# Patient Record
Sex: MALE
[De-identification: names, ages, dates, MRNs, and addresses within clinical notes are randomized per-mention and may not be internally consistent; named-entity substitution may affect disease eponyms.]

---

## 2017-07-10 NOTE — RAD
PROCEDURE:  Right Foot Radiographs.



HISTORY:

Lateral pain  



COMPARISON:

None.



FINDINGS:



BONES:

No evidence of acute displaced fracture nor dislocation. 



Probable old healed fracture deformity proximal aspect 3rd metatarsal 



JOINTS:

Minor degenerative changes with small osteophytes seen arising from 

the anterior and inferior margins of the distal tibia. .



SOFT TISSUES:

Minor soft tissue swelling lateral greater than medial 



OTHER FINDINGS:

None.



IMPRESSION:

No evidence of acute displaced fracture nor dislocation. .  Probable 

old healed fracture deformity proximal 3rd metatarsal 



Minor degenerative changes medial aspect right tibiotalar 

articulation. Minor soft tissue swelling lateral greater than medial

## 2017-07-10 NOTE — CP.PCM.CON
History of Present Illness





- History of Present Illness


History of Present Illness: 





56 y/o male with no significant PMHx seen at bedside in ED complaining of pain 

and swelling in his right ankle. Pt states that the pain started yesterday and 

it ha progressively gotten worst from then on. Pt states that this morning his 

pain was 10/10 on VAS which prompted him to come to the ED. Pt states that he 

did not take any pain medications prior to his arrival to the ED. Pt denies of 

any trauma or injury to the foot. 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 

worker on the roofs but denies of any ankle twisting. Pt states that this 

happens in his knee joint as well as shoulder joint at time to time. Pt 

describes his diet as very red meat heavy. Pt denies of trying any new activity 

or new shoe gear. Pt denies of any recent F/V/C/SOB today. No other pedal 

complains are noted at this time. 





PMHx: denies


PSHx: denies


SHx: denies


Allergies: Penicillins





Review of Systems





- Constitutional


Constitutional: As Per HPI





Past Patient History





- Infectious Disease


Hx of Infectious Diseases: None





- Past Social History


Alcohol: None


Drugs: Denies





- PSYCHIATRIC


Hx Substance Use: No





- SURGICAL HISTORY


Hx Cholecystectomy: Yes





- ANESTHESIA


Hx Anesthesia: Yes





Meds


Home Medications: 


 Home Medication List











 Medication  Instructions  Recorded  Confirmed  Type


 


Naproxen [Naprosyn] 500 mg PO BID PRN #20 tablet 07/10/17  Rx











Allergies/Adverse Reactions: 


 Allergies











Allergy/AdvReac Type Severity Reaction Status Date / Time


 


Penicillins Allergy  RASH Verified 07/01/16 20:50














Physical Exam





- Constitutional


Appears: Well, Non-toxic, No Acute Distress





- Extremities Exam


Additional comments: 





VASC: DP/PT pulses are palpable 2/4 b/l, CRT: < 3 sec to all digits, 

temperature gradient is warm to cool on the left and warm to warm on the right, 

non-pitting edema with erythema noted on the lateral aspect of the right ankle 

as well as medial aspect





DERM: no open lesions, no interdigital maceration, skin is intact with no 

puncturing of the skin surrounding the right ankle, no clinical suspicion of 

infection





NEURO: protective sensation grossly intact





ORTHO: MMT: 4/5 on the right and 5/5 on left during dorsiflexion, plantarflexion

, inversion and eversion. slightly diminished ROM at the ankle joint on the 

right, pain upon palpation on the posterior lateral aspect of the lateral 

malleolus, diffuse pain surrounding the anterior ankle as well as medial ankle, 

mild pain on plantarflexion and eversion at the ankle joint, Campos test is 

negative, no pain on palpation of the calf b/l





- Neurological Exam


Neurological exam: Alert, Oriented x3





- Psychiatric Exam


Psychiatric exam: Normal Affect, Normal Mood





Results





- Vital Signs


Recent Vital Signs: 


 Last Vital Signs











Temp  97.6 F   07/10/17 14:04


 


Pulse  78   07/10/17 14:04


 


Resp  19   07/10/17 14:04


 


BP  126/78   07/10/17 14:04


 


Pulse Ox  98   07/10/17 14:04














- Labs


Result Diagrams: 


 07/10/17 10:13





 07/10/17 10:13


Labs: 


 Laboratory Results - last 24 hr











  07/10/17 07/10/17 07/10/17





  10:13 10:13 10:13


 


WBC  8.0  


 


RBC  5.23  


 


Hgb  15.6  


 


Hct  45.5  


 


MCV  87.0  


 


MCH  29.8  


 


MCHC  34.3  


 


RDW  13.6  


 


Plt Count  148  


 


MPV  10.0  


 


Neut % (Auto)  69.0  


 


Lymph % (Auto)  18.7 L  


 


Mono % (Auto)  10.9 H  


 


Eos % (Auto)  1.0  


 


Baso % (Auto)  0.4  


 


Neut #  5.5  


 


Lymph #  1.5  


 


Mono #  0.9 H  


 


Eos #  0.1  


 


Baso #  0.0  


 


PT    11.7


 


INR    1.0


 


APTT    31.8


 


Sodium   138 


 


Potassium   3.8 


 


Chloride   101 


 


Carbon Dioxide   27 


 


Anion Gap   13 


 


BUN   24 H 


 


Creatinine   0.7 L 


 


Est GFR ( Amer)   > 60 


 


Est GFR (Non-Af Amer)   > 60 


 


Random Glucose   97 


 


Uric Acid   4.1 


 


Calcium   9.4 


 


Total Bilirubin   0.7 


 


AST   36 


 


ALT   60 


 


Alkaline Phosphatase   88 


 


Total Protein   7.6 


 


Albumin   4.4 


 


Globulin   3.2 


 


Albumin/Globulin Ratio   1.4 














Assessment & Plan





- Assessment and Plan (Free Text)


Assessment: 





56 y/o male seen at bedside in ED for pain in the right ankle secondary to 

gouty attack


Plan: 





Pt evaluated and chart reviewed


Pt discussed in details with attending Dr. Hernandez


Vitals and labs reviewed (pt is afebrile, WBC @ 8.0 and uric acid levels are 4.1

)


x-rays taken and reviewed: no gross danielle abnormalities noted, no fractures, no 

malalignment of the ankle joint noted


Pt educated the possible etiology of the ankle pain and educated to change his 

diet


Pt educated to elevate the leg and provide compression using an ACE bandage


Pt given an anti-inflammatory to manage his pain


Pt educated to follow up in podiatry clinic


Pt educated if the symptoms get worst or if any systemic symptomes are felt 

then return to ED sooner


Pt demonstrated verbal understanding


Pt stable from podiatry standpoint


Thank you for podiatry consult





- Date & Time


Date: 07/10/17


Time: 14:37

## 2017-07-10 NOTE — ED PDOC
Lower Extremity Pain/Injury


Time Seen by Provider: 07/10/17 09:42


Chief Complaint (Nursing): Lower Extremity Problem/Injury


Chief Complaint (Provider): Right foot swelling and pain


History Per: Patient


History/Exam Limitations: no limitations


Onset/Duration Of Symptoms: Days (x2 days)


Current Symptoms Are (Timing): Still Present


Pain Scale Rating Of: 10


Additional History Per: Family


Additional Complaint(s): 








Cal Humphries is a 57-year-old male who was brought by family to the emergency 

department for an evaluation of the right foot for swelling/pain and nausea, 

ongoing x2 days. Patient first noticed the swelling yesterday morning when he 

woke up and iced the area without relief. The swelling has increased over the 

past 24 hours. Patient reports being unable to sleep last night due to pain. 

Denies taking medications for the relief of symptoms, fever, fall and injury. 





PMD: None reported 





Past Medical History


Reviewed: Historical Data, Nursing Documentation, Vital Signs


Vital Signs: 





 Last Vital Signs











Temp  97 F L  07/10/17 09:13


 


Pulse  66   07/10/17 09:13


 


Resp      


 


BP  112/57 L  07/10/17 09:13


 


Pulse Ox  98   07/10/17 09:13














- Medical History


PMH: No Chronic Diseases





- Surgical History


Surgical History: Cholecystectomy





- Family History


Family History: States: Unknown Family Hx





- Social History


Current smoker - smoking cessation education provided: No


Alcohol: None


Drugs: Denies





- Home Medications


Home Medications: 


 Ambulatory Orders











 Medication  Instructions  Recorded


 


Erythromycin 0.5% [Erythromycin] 1 applic RIGHTEYE Q6 #1 tube 07/01/16


 


Tramadol HCl [Ultram] 50 mg PO BID PRN #30 tablet 07/01/16


 


Naproxen [Naprosyn] 500 mg PO BID PRN #20 tablet 07/10/17














- Allergies


Allergies/Adverse Reactions: 


 Allergies











Allergy/AdvReac Type Severity Reaction Status Date / Time


 


Penicillins Allergy  RASH Verified 07/01/16 20:50














Review of Systems


ROS Statement: Except As Marked, All Systems Reviewed And Found Negative


Constitutional: Negative for: Fever, Other (Fall or injury)


Gastrointestinal: Positive for: Nausea


Musculoskeletal: Positive for: Foot Pain (Right foot swelling and pain)





Physical Exam





- Reviewed


Nursing Documentation Reviewed: Yes


Vital Signs Reviewed: Yes





- Physical Exam


Appears: Positive for: Non-toxic, No Acute Distress


Head Exam: Positive for: ATRAUMATIC, NORMAL INSPECTION, NORMOCEPHALIC


Skin: Positive for: Normal Color, Warm, Dry


Eye Exam: Positive for: EOMI, Normal appearance, PERRL


Neck: Positive for: Normal, Painless ROM, Supple


Pulses-Dorsalis Pedis (L): 2+


Pulses-Dorsalis Pedis (R): 2+


Extremity: Positive for: Tenderness (Right foot), Swelling (Erythema to the 

lateral malleolar area with edema. Sensation intact).  Negative for: Normal ROM


Neurologic/Psych: Positive for: Alert, Oriented.  Negative for: Motor/Sensory 

Deficits





- Laboratory Results


Result Diagrams: 


 07/10/17 10:13





 07/10/17 10:13





- ECG


O2 Sat by Pulse Oximetry: 98 (RA)


Pulse Ox Interpretation: Normal





Medical Decision Making


Medical Decision Making: 








Time: 10:00


Initial impression: Right foot swelling


Initial plan:


---CMP 


---CBC


---Uric acid


---PTT 


---Prothrombin time


---X-Ray right ankle


---X-Ray right foot


---Toradol 15 mg IM


---Reassessment





Time: 12: 17


--X-ray of the ankle and foot show no significant abnormalities (read by me). 


--Pending podiatry on call to come and evaluate patient. 





Pt evaluated by Podiatry, findings c/w gout.  Discharge home with NSAIDs to 

follow-up with Podiatry Clinic. 





Scribe Attestation:


Documented by Kasia Tran, acting as a scribe for Evelia Shetty MD.





Provider Scribe Attestation:


All medical record entries made by the Scribe were at my direction and 

personally dictated by me. I have reviewed the chart and agree that the record 

accurately reflects my personal performance of the history, physical exam, 

medical decision making, and the department course for this patient. I have 

also personally directed, reviewed, and agree with the discharge instructions 

and disposition.








Disposition





- Clinical Impression


Clinical Impression: 


 Gout of ankle








- Disposition


Referrals: 


Podiatry Clinic [Outside]


Disposition: Routine/Home


Disposition Time: 13:36


Condition: STABLE


Prescriptions: 


Naproxen [Naprosyn] 500 mg PO BID PRN #20 tablet


 PRN Reason: Pain, Moderate (4-7)


Instructions:  Gout (ED)


Print Language: Bulgarian

## 2017-07-10 NOTE — RAD
PROCEDURE:  Right Ankle Radiographs.



HISTORY:

Lateral pain, redness, swelling  



COMPARISON:

None



FINDINGS:



BONES:

No evidence of acute displaced fracture nor dislocation.  Osseous 

structures appear intact.  Talar dome intact. . 



JOINTS:

Ankle mortise maintained. .  There is a tiny osteophyte seen arising 

from the inferior tip of the medial malleolus with minor degenerative 

changes along the medial aspect of the tibiotalar articulation. Mild 

soft tissue swelling overlying the lateral and to a lesser degree 

medial malleoli. 



SOFT TISSUES:

Normal. 



OTHER FINDINGS:

None.



IMPRESSION:

No evidence of acute displaced fracture nor dislocation. Minor 

degenerative changes tibiotalar articulation changes. Mild soft 

tissue swelling lateral greater than medial.

## 2018-02-01 ENCOUNTER — HOSPITAL ENCOUNTER (OUTPATIENT)
Dept: HOSPITAL 14 - H.ER | Age: 58
Setting detail: OBSERVATION
LOS: 2 days | Discharge: HOME | End: 2018-02-03
Attending: FAMILY MEDICINE | Admitting: FAMILY MEDICINE
Payer: SELF-PAY

## 2018-02-01 VITALS — BODY MASS INDEX: 28.4 KG/M2

## 2018-02-01 DIAGNOSIS — K57.32: Primary | ICD-10-CM

## 2018-02-01 DIAGNOSIS — M10.9: ICD-10-CM

## 2018-02-01 DIAGNOSIS — R07.9: ICD-10-CM

## 2018-02-01 DIAGNOSIS — I10: ICD-10-CM

## 2018-02-01 DIAGNOSIS — K59.00: ICD-10-CM

## 2018-02-01 DIAGNOSIS — I25.2: ICD-10-CM

## 2018-02-01 DIAGNOSIS — Z98.61: ICD-10-CM

## 2018-02-01 LAB
ALBUMIN SERPL-MCNC: 4.4 G/DL (ref 3.5–5)
ALBUMIN/GLOB SERPL: 1.4 {RATIO} (ref 1–2.1)
ALT SERPL-CCNC: 49 U/L (ref 21–72)
AST SERPL-CCNC: 36 U/L (ref 17–59)
BACTERIA #/AREA URNS HPF: (no result) /[HPF]
BASOPHILS # BLD AUTO: 0 K/UL (ref 0–0.2)
BASOPHILS NFR BLD: 0.3 % (ref 0–2)
BILIRUB UR-MCNC: NEGATIVE MG/DL
BUN SERPL-MCNC: 25 MG/DL (ref 9–20)
CALCIUM SERPL-MCNC: 9.5 MG/DL (ref 8.4–10.2)
COLOR UR: YELLOW
EOSINOPHIL # BLD AUTO: 0.1 K/UL (ref 0–0.7)
EOSINOPHIL NFR BLD: 1.7 % (ref 0–4)
ERYTHROCYTE [DISTWIDTH] IN BLOOD BY AUTOMATED COUNT: 13.9 % (ref 11.5–14.5)
GFR NON-AFRICAN AMERICAN: > 60
GLUCOSE UR STRIP-MCNC: (no result) MG/DL
HGB BLD-MCNC: 15 G/DL (ref 12–18)
LEUKOCYTE ESTERASE UR-ACNC: (no result) LEU/UL
LIPASE SERPL-CCNC: 81 U/L (ref 23–300)
LYMPHOCYTES # BLD AUTO: 2.2 K/UL (ref 1–4.3)
LYMPHOCYTES NFR BLD AUTO: 32 % (ref 20–40)
MCH RBC QN AUTO: 28.4 PG (ref 27–31)
MCHC RBC AUTO-ENTMCNC: 32.9 G/DL (ref 33–37)
MCV RBC AUTO: 86.2 FL (ref 80–94)
MONOCYTES # BLD: 0.6 K/UL (ref 0–0.8)
MONOCYTES NFR BLD: 8.5 % (ref 0–10)
NEUTROPHILS # BLD: 3.9 K/UL (ref 1.8–7)
NEUTROPHILS NFR BLD AUTO: 57.5 % (ref 50–75)
NRBC BLD AUTO-RTO: 0.9 % (ref 0–0)
PH UR STRIP: 5 [PH] (ref 5–8)
PLATELET # BLD: 164 K/UL (ref 130–400)
PMV BLD AUTO: 9.8 FL (ref 7.2–11.7)
PROT UR STRIP-MCNC: 30 MG/DL
RBC # BLD AUTO: 5.27 MIL/UL (ref 4.4–5.9)
RBC # UR STRIP: NEGATIVE /UL
SP GR UR STRIP: 1.03 (ref 1–1.03)
SQUAMOUS EPITHIAL: < 1 /HPF (ref 0–5)
URINE CLARITY: (no result)
URINE NITRATE: NEGATIVE
UROBILINOGEN UR-MCNC: 2 MG/DL (ref 0.2–1)
WBC # BLD AUTO: 6.7 K/UL (ref 4.8–10.8)

## 2018-02-01 PROCEDURE — 96361 HYDRATE IV INFUSION ADD-ON: CPT

## 2018-02-01 PROCEDURE — 71046 X-RAY EXAM CHEST 2 VIEWS: CPT

## 2018-02-01 PROCEDURE — 84484 ASSAY OF TROPONIN QUANT: CPT

## 2018-02-01 PROCEDURE — 99285 EMERGENCY DEPT VISIT HI MDM: CPT

## 2018-02-01 PROCEDURE — 96365 THER/PROPH/DIAG IV INF INIT: CPT

## 2018-02-01 PROCEDURE — 80053 COMPREHEN METABOLIC PANEL: CPT

## 2018-02-01 PROCEDURE — 74176 CT ABD & PELVIS W/O CONTRAST: CPT

## 2018-02-01 PROCEDURE — 93005 ELECTROCARDIOGRAM TRACING: CPT

## 2018-02-01 PROCEDURE — 96376 TX/PRO/DX INJ SAME DRUG ADON: CPT

## 2018-02-01 PROCEDURE — 96372 THER/PROPH/DIAG INJ SC/IM: CPT

## 2018-02-01 PROCEDURE — 96366 THER/PROPH/DIAG IV INF ADDON: CPT

## 2018-02-01 PROCEDURE — 96375 TX/PRO/DX INJ NEW DRUG ADDON: CPT

## 2018-02-01 PROCEDURE — 87086 URINE CULTURE/COLONY COUNT: CPT

## 2018-02-01 PROCEDURE — 81003 URINALYSIS AUTO W/O SCOPE: CPT

## 2018-02-01 PROCEDURE — 85025 COMPLETE CBC W/AUTO DIFF WBC: CPT

## 2018-02-01 PROCEDURE — 83690 ASSAY OF LIPASE: CPT

## 2018-02-01 NOTE — CT
EXAM:

  CT Abdomen and Pelvis Without Intravenous Contrast



CLINICAL HISTORY:

  57 years old, male; Pain; Abdominal pain; Flank; Right; Prior surgery; 

Surgery date: 6+ months; Surgery type: Gb removed; Additional info: Evaluate 

for renal colic



TECHNIQUE:

  Axial computed tomography images of the abdomen and pelvis without 

intravenous contrast.  All CT scans at this facility use one or more dose 

reduction techniques, viz.: automated exposure control; ma/kV adjustment per 

patient size (including targeted exams where dose is matched to indication; 

i.e. head); or iterative reconstruction technique.

  Coronal and sagittal reformatted images were created and reviewed.



COMPARISON:

  No relevant prior studies available.



FINDINGS:

  Lower thorax:  Minimal atelectasis.  RIGHT lower lobe calcified granuloma.



 ABDOMEN:

  Liver:  Few calcifications.

  Gallbladder and bile ducts:  Cholecystectomy.  No significant ductal dilation.

  Pancreas:  Unremarkable.  No ductal dilation.

  Spleen:  No splenomegaly.

  Adrenals:  No mass.

  Kidneys and ureters:  No renal calculi.  No hydronephrosis.

  Stomach and bowel:  Few scattered diverticula within colon.  Apparent minimal 

stranding about diverticulum along ascending colon.  No definite mural 

thickening.  No obstruction.

  Appendix:  Normal caliber.  No inflammation.



 PELVIS:

  Bladder:  Unremarkable.  No stones.

  Reproductive:  Unremarkable as visualized.



 ABDOMEN and PELVIS:

  Intraperitoneal space:  No significant fluid collection.  No free air.

  Bones/joints:  Degenerative changes of spine.  No acute fracture.  Probable 

bone islands.

  Soft tissues:  Small calcification or clip along anterior abdominal wall.

  Vasculature:  Mild atherosclerotic disease.  No aneurysm.

  Lymph nodes:  No pathologically enlarged lymph nodes.



IMPRESSION:     

1.  No definite CT evidence of urolithiasis.

2.  Possible early diverticulitis of right colon.

3.  Incidental/non-acute findings are described above.

## 2018-02-01 NOTE — ED PDOC
HPI: Abdomen


Time Seen by Provider: 02/01/18 20:43


Chief Complaint (Nursing): Abdominal Pain


Chief Complaint (Provider): abdominal pain


History Per: Patient (56 y/o male here with complaint of generalized abdominal 

pain located on right side x 4 days.   States pain radiating to his back waxing 

and waning and feels like a cramp.  States symptoms began with left sided chest 

pain intermittent pulsating.  Denies any vomiting but states he attempted to 

make himself vomit.  denies any fevers or chills.  States he has had cardiac 

catherization in past wnl.  Has had gallbladder removed in past.)





Past Medical History


Reviewed: Historical Data, Nursing Documentation, Vital Signs


Vital Signs: 


 Last Vital Signs











Temp  96.0 F L  02/01/18 20:05


 


Pulse  60   02/01/18 20:05


 


Resp  16   02/01/18 20:05


 


BP  126/78   02/01/18 20:05


 


Pulse Ox  100   02/01/18 22:33














- Medical History


PMH: HTN


   Denies: Chronic Kidney Disease





- Surgical History


Surgical History: Cholecystectomy





- Family History


Family History: States: Unknown Family Hx





- Home Medications


Home Medications: 


 Ambulatory Orders











 Medication  Instructions  Recorded


 


Aspirin [Ecotrin] 81 mg PO DAILY 09/13/17


 


Atorvastatin [Lipitor] 20 mg PO HS #30 tab 09/14/17


 


Famotidine [Heartburn Prevention] 20 mg PO BID #60 tablet 09/14/17


 


Lidocaine 5% [Lidoderm] 1 ea TD DAILY #30 patch 09/14/17














- Allergies


Allergies/Adverse Reactions: 


 Allergies











Allergy/AdvReac Type Severity Reaction Status Date / Time


 


Penicillins Allergy  RASH Verified 07/01/16 20:50














Review of Systems


ROS Statement: Except As Marked, All Systems Reviewed And Found Negative


Cardiovascular: Positive for: Chest Pain


Gastrointestinal: Positive for: Abdominal Pain





Physical Exam





- Reviewed


Nursing Documentation Reviewed: Yes


Vital Signs Reviewed: Yes





- Physical Exam


Appears: Positive for: Well, Non-toxic, No Acute Distress


Head Exam: Positive for: ATRAUMATIC, NORMAL INSPECTION, NORMOCEPHALIC


Skin: Positive for: Normal Color, Warm, DRY


Eye Exam: Positive for: EOMI, Normal appearance, PERRL


ENT: Positive for: Normal ENT Inspection


Neck: Positive for: Normal, Painless ROM


Cardiovascular/Chest: Positive for: Regular Rate, Rhythm


Respiratory: Positive for: CNT, Normal Breath Sounds


Gastrointestinal/Abdominal: Positive for: Normal Exam, Bowel Sounds, Soft, 

Tenderness (right flank/right CVA tenderness/ rlq pain )


Back: Positive for: Normal Inspection


Extremity: Positive for: Normal ROM


Neurologic/Psych: Positive for: Alert, Oriented





- Laboratory Results


Result Diagrams: 


 02/01/18 20:45





 02/01/18 20:45





- ECG


O2 Sat by Pulse Oximetry: 100





- Progress


ED Course And Treament: 





CT ABD/PELVIS:





FINDINGS:


Lower thorax: Minimal atelectasis. RIGHT lower lobe calcified granuloma.


ABDOMEN:


Liver: Few calcifications.


Gallbladder and bile ducts: Cholecystectomy. No significant ductal dilation.


Pancreas: Unremarkable. No ductal dilation.


Spleen: No splenomegaly.


Adrenals: No mass.


Kidneys and ureters: No renal calculi. No hydronephrosis.


Stomach and bowel: Few scattered diverticula within colon. Apparent minimal 

stranding about


diverticulum along ascending colon. No definite mural thickening. No 

obstruction.


Appendix: Normal caliber. No inflammation.


PELVIS:


Bladder: Unremarkable. No stones.


Reproductive: Unremarkable as visualized.


ABDOMEN and PELVIS:


Intraperitoneal space: No significant fluid collection. No free air.


Bones/joints: Degenerative changes of spine. No acute fracture. Probable bone 

islands.


Soft tissues: Small calcification or clip along anterior abdominal wall.





Vasculature: Mild atherosclerotic disease. No aneurysm.


Lymph nodes: No pathologically enlarged lymph nodes.


IMPRESSION:


1. No definite CT evidence of urolithiasis.


2. Possible early diverticulitis of right colon.


3. Incidental/non-acute findings are described above.


Thank you for allowing us to participate in the care of your patient.


Dictated and Authenticated by: Andre Vee MD





CIPRO 400 MG IV X 1 DOSE


FLAGYL 500 MG IV X 1 DOSE





CXR: NAD





D/W FAMILY MEDICINE RESIDENT 22:45 PM








Disposition





- Clinical Impression


Clinical Impression: 


 Diverticulitis, Chest pain








- Patient ED Disposition


Is Patient to be Admitted: Yes





- Disposition


Disposition Time: 22:45


Condition: FAIR


Forms:  [facility] (English)





- Pt Status Changed To:


Hospital Disposition Of: Inpatient





- Admit Certification


Admit to Inpatient:: After my assessment, the patient will require 

hospitalization for at least two midnights.  This is because of the severity of 

symptoms shown, intensity of services needed, and/or the medical risk in this 

patient being treated as an outpatient.

## 2018-02-02 RX ADMIN — METRONIDAZOLE SCH MLS/HR: 500 INJECTION, SOLUTION INTRAVENOUS at 17:39

## 2018-02-02 RX ADMIN — CIPROFLOXACIN SCH MLS/HR: 2 INJECTION, SOLUTION INTRAVENOUS at 09:42

## 2018-02-02 RX ADMIN — METRONIDAZOLE SCH MLS/HR: 500 INJECTION, SOLUTION INTRAVENOUS at 04:57

## 2018-02-02 RX ADMIN — CIPROFLOXACIN SCH MLS/HR: 2 INJECTION, SOLUTION INTRAVENOUS at 22:12

## 2018-02-02 RX ADMIN — METRONIDAZOLE SCH MLS/HR: 500 INJECTION, SOLUTION INTRAVENOUS at 10:20

## 2018-02-02 RX ADMIN — METRONIDAZOLE SCH MLS/HR: 500 INJECTION, SOLUTION INTRAVENOUS at 22:13

## 2018-02-02 RX ADMIN — ENOXAPARIN SODIUM SCH MG: 40 INJECTION SUBCUTANEOUS at 10:20

## 2018-02-02 NOTE — CARD
--------------- APPROVED REPORT --------------





EKG Measurement

Heart [id number]

[place] [id number]

[id number]

[id number]

[id number]



<Conclusion>

Sinus bradycardia

Inferior infarct, age undetermined

Abnormal ECG

## 2018-02-02 NOTE — RAD
HISTORY:

routine  



COMPARISON:

Comparison made with chest radiograph 09/13/2017



TECHNIQUE:

Chest PA and lateral



FINDINGS:



LUNGS:

There appears to be some mild atelectasis in the left lower lung 

field.



PLEURA:

No significant pleural effusion identified. No pneumothorax apparent.



CARDIOVASCULAR:

Normal.



OSSEOUS STRUCTURES:

Mild multilevel degenerative spondylosis of the thoracic spine escape 

last CT scan chest 7.



VISUALIZED UPPER ABDOMEN:

Normal.



OTHER FINDINGS:

None.



IMPRESSION:

Mild atelectasis left lower lung field.

## 2018-02-02 NOTE — CP.PCM.PN
Objective





- Vital Signs/Intake and Output


Vital Signs (last 24 hours): 


 











Temp Pulse Resp BP Pulse Ox


 


 97.8 F   51 L  16   99/57 L  95 


 


 02/02/18 01:02  02/02/18 01:02  02/02/18 01:02  02/02/18 01:02  02/02/18 01:02











- Labs


Labs: 


 





 02/01/18 20:45 





 02/01/18 20:45

## 2018-02-02 NOTE — CP.PCM.HP
History of Present Illness





- History of Present Illness


History of Present Illness: 








58 yo ,m, PMHx/o HTN, Gout, NSTEMI( Cardiac cath with normal findings at 

[facility](as per patient) presents to ED c/o Abdominal pain and chest 

pain started 4 days ago. Reports symptoms began with LLQ abd pain radiated to 

chest left side, intermittent, not related with any particular event or diet 

change. chest pain 5-6 /10 in intensity, sharp, not related with deep 

inspiration or arm movement. Patient states that today the abdominal pain is 

RUQ and right flank, 9/10 in intensity associated with abd bloating sensation, 

nausea yesterday. He denies vomiting, diarrhea, fever, cough, SOB, palpitation, 

hematuria, abd trauma. Reports he was seen in the past 10/2017 by Cardiologist 

Dr Tovar and stress test was ordered buy he got late to the appt and could not 

schedule a new appt. 





PMD: [facility] last visit 2017 Dr Clemons


Allergies: PCN - Severe Hives 


Meds: Aspirin. Reports had not anymore refill of colesterol medication( 

atorvastatin 40 )


PMHx: HTN, Gout, Cardiac cath with normal findings at [facility](as per 

patient)


PSHx: Cholecystectomy, Left forearm fracture 


FMHx: Mother  due to Colon cancer


ScHx: No ETOH,rect drugs, cig


         Works in construction


     





ED Course


VS: normal


Labs: CBC normal, CMP BUN 25, trop ng x 1, urine normal 


Imaging: EKG:sinus bradycardia HR: 56, no ischemic changes,, CXR: normal. 

pending final report . CT abd:No definite CT evidence of urolithiasis.Possible 

early diverticulitis of right colon.


Treatment: NS 1l, zofran, dilaudid, ciproflox, flagyl x 1dose, toradol 














Present on Admission





- Present on Admission


Any Indicators Present on Admission: No


History of DVT/PE: No


History of Uncontrolled Diabetes: No





Review of Systems





- Cardiovascular


Cardiovascular: As Per HPI





- Respiratory


Respiratory: As Per HPI





- Gastrointestinal


Gastrointestinal: As Per HPI





- Genitourinary


Genitourinary: As Per HPI





Past Patient History





- Infectious Disease


Hx of Infectious Diseases: None





- Past Social History


Smoking Status: Never Smoked





- CARDIAC


Hx Hypertension: Yes





- PULMONARY


Hx Respiratory Disorders: No





- NEUROLOGICAL


Hx Neurological Disorder: No





- HEENT


Hx HEENT Problems: No





- RENAL


Hx Chronic Kidney Disease: No





- ENDOCRINE/METABOLIC


Hx Endocrine Disorders: No





- HEMATOLOGICAL/ONCOLOGICAL


Hx Blood Disorders: No





- INTEGUMENTARY


Hx Dermatological Problems: No





- MUSCULOSKELETAL/RHEUMATOLOGICAL


Hx Musculoskeletal Disorders: Yes


Hx Falls: Yes (fell 4-6 wks ago)





- GASTROINTESTINAL


Hx Gastrointestinal Disorders: No





- GENITOURINARY/GYNECOLOGICAL


Hx Genitourinary Disorders: No





- PSYCHIATRIC


Hx Psychophysiologic Disorder: No


Hx Substance Use: No





- SURGICAL HISTORY


Hx Cholecystectomy: Yes





- ANESTHESIA


Hx Anesthesia: Yes





Meds


Allergies/Adverse Reactions: 


 Allergies











Allergy/AdvReac Type Severity Reaction Status Date / Time


 


Penicillins Allergy  RASH Verified 16 20:50














Physical Exam





- Constitutional


Appears: Non-toxic, No Acute Distress





- Head Exam


Head Exam: ATRAUMATIC, NORMOCEPHALIC





- Eye Exam


Eye Exam: Normal appearance





- ENT Exam


ENT Exam: Mucous Membranes Moist





- Neck Exam


Neck exam: Positive for: Normal Inspection





- Respiratory Exam


Respiratory Exam: Clear to Auscultation Bilateral.  absent: Rales, Rhonchi, 

Wheezes





- Cardiovascular Exam


Cardiovascular Exam: REGULAR RHYTHM, +S1, +S2





- GI/Abdominal Exam


GI & Abdominal Exam: Normal Bowel Sounds, Soft, Tenderness.  absent: Guarding, 

Rebound


Additional comments: 





RUQ and right flank. CVTA right side





- Extremities Exam


Extremities exam: Positive for: normal inspection.  Negative for: pedal edema, 

tenderness





- Back Exam


Back exam: CVA tenderness (R), NORMAL INSPECTION





- Neurological Exam


Neurological exam: Alert, Oriented x3





- Psychiatric Exam


Psychiatric exam: Normal Mood





- Skin


Skin Exam: Intact





Results





- Vital Signs


Recent Vital Signs: 





 Last Vital Signs











Temp  97.8 F   18 04:42


 


Pulse  51 L  18 04:42


 


Resp  16   18 04:42


 


BP  97/63 L  18 04:42


 


Pulse Ox  94 L  18 04:42














- Labs


Result Diagrams: 


 18 20:45





 18 20:45


Labs: 





 Laboratory Results - last 24 hr











  18





  20:45 20:45 21:00


 


WBC  6.7  


 


RBC  5.27  


 


Hgb  15.0  


 


Hct  45.4  


 


MCV  86.2  


 


MCH  28.4  


 


MCHC  32.9 L  


 


RDW  13.9  


 


Plt Count  164  


 


MPV  9.8  


 


Neut % (Auto)  57.5  


 


Lymph % (Auto)  32.0  


 


Mono % (Auto)  8.5  


 


Eos % (Auto)  1.7  


 


Baso % (Auto)  0.3  


 


Neut # (Auto)  3.9  


 


Lymph # (Auto)  2.2  


 


Mono # (Auto)  0.6  


 


Eos # (Auto)  0.1  


 


Baso # (Auto)  0.0  


 


Sodium   142 


 


Potassium   3.7 


 


Chloride   101 


 


Carbon Dioxide   30 


 


Anion Gap   15 


 


BUN   25 H 


 


Creatinine   0.9 


 


Est GFR ( Amer)   > 60 


 


Est GFR (Non-Af Amer)   > 60 


 


Random Glucose   84 


 


Calcium   9.5 


 


Total Bilirubin   0.7 


 


AST   36 


 


ALT   49 


 


Alkaline Phosphatase   86 


 


Troponin I   < 0.0120 


 


Total Protein   7.5 


 


Albumin   4.4 


 


Globulin   3.1 


 


Albumin/Globulin Ratio   1.4 


 


Lipase   81 


 


Urine Color    Yellow


 


Urine Clarity    Slighty-cloudy


 


Urine pH    5.0


 


Ur Specific Gravity    1.029


 


Urine Protein    30


 


Urine Glucose (UA)    Neg


 


Urine Ketones    Negative


 


Urine Blood    Negative


 


Urine Nitrate    Negative


 


Urine Bilirubin    Negative


 


Urine Urobilinogen    2.0


 


Ur Leukocyte Esterase    Neg


 


Urine RBC (Auto)    2


 


Urine Microscopic WBC    2


 


Ur Squamous Epith Cells    < 1


 


Urine Bacteria    Rare














  18





  04:46


 


WBC 


 


RBC 


 


Hgb 


 


Hct 


 


MCV 


 


MCH 


 


MCHC 


 


RDW 


 


Plt Count 


 


MPV 


 


Neut % (Auto) 


 


Lymph % (Auto) 


 


Mono % (Auto) 


 


Eos % (Auto) 


 


Baso % (Auto) 


 


Neut # (Auto) 


 


Lymph # (Auto) 


 


Mono # (Auto) 


 


Eos # (Auto) 


 


Baso # (Auto) 


 


Sodium 


 


Potassium 


 


Chloride 


 


Carbon Dioxide 


 


Anion Gap 


 


BUN 


 


Creatinine 


 


Est GFR ( Amer) 


 


Est GFR (Non-Af Amer) 


 


Random Glucose 


 


Calcium 


 


Total Bilirubin 


 


AST 


 


ALT 


 


Alkaline Phosphatase 


 


Troponin I  < 0.0120


 


Total Protein 


 


Albumin 


 


Globulin 


 


Albumin/Globulin Ratio 


 


Lipase 


 


Urine Color 


 


Urine Clarity 


 


Urine pH 


 


Ur Specific Gravity 


 


Urine Protein 


 


Urine Glucose (UA) 


 


Urine Ketones 


 


Urine Blood 


 


Urine Nitrate 


 


Urine Bilirubin 


 


Urine Urobilinogen 


 


Ur Leukocyte Esterase 


 


Urine RBC (Auto) 


 


Urine Microscopic WBC 


 


Ur Squamous Epith Cells 


 


Urine Bacteria 














Assessment & Plan





- Assessment and Plan (Free Text)


Plan: 





58 yo ,m, PMHx/o HTN, Gout, NSTEMI( Cardiac cath with normal findings at 

[facility](as per patient) admitted for Observation of Chest pain and 

Diverticulitis. 








Assessment/Plan





1) Chest pain


-to r/o ACS


-EKG sinus bradycardia HR: 56, no ischemic changes, trop x1 neg, CXR no active 

disease. pending  report. 


-Hx/o NSTEMI.hospitalised in [facility] with chest pain. Elevated 

Troponins were found and the pt underwent a cor angio at [facility]. No 

coronary intervention was required (??Normal coronaries) 


-Patient seen by cardiologist Dr Tovar 10/31/17. Suggested stress test. patient 

did not did it. 


-To consider Cardiologist consult this admission.  


-f/u 2 troponin, EKG


-Admit tele





2) Abdominal pain


-secondary to diverticulitis


-CT Abd: . No definite CT evidence of urolithiasis. Possible early 

diverticulitis of right colon.


-s/p Cipro/flagyl x 1 dose ED


-Cipro 400 mg IV BID+ Flagyl 500 mg IV Q6h


-NPO, IV fluids








3) Hx/o HTN


-only taking daily Aspirin 


-Lipid profile normal 2017





4) Hx/o Gout


-no taking medication





5) DVT Prophylaxis


-Lovenox 40 mg sc daily

## 2018-02-02 NOTE — CP.PCM.HP
Past Patient History





- Infectious Disease


Hx of Infectious Diseases: None





- Past Social History


Smoking Status: Never Smoked





- CARDIAC


Hx Hypertension: Yes





- PULMONARY


Hx Respiratory Disorders: No





- NEUROLOGICAL


Hx Neurological Disorder: No





- HEENT


Hx HEENT Problems: No





- RENAL


Hx Chronic Kidney Disease: No





- ENDOCRINE/METABOLIC


Hx Endocrine Disorders: No





- HEMATOLOGICAL/ONCOLOGICAL


Hx Blood Disorders: No





- INTEGUMENTARY


Hx Dermatological Problems: No





- MUSCULOSKELETAL/RHEUMATOLOGICAL


Hx Musculoskeletal Disorders: Yes


Hx Falls: Yes (fell 4-6 wks ago)





- GASTROINTESTINAL


Hx Gastrointestinal Disorders: No





- GENITOURINARY/GYNECOLOGICAL


Hx Genitourinary Disorders: No





- PSYCHIATRIC


Hx Psychophysiologic Disorder: No


Hx Substance Use: No





- SURGICAL HISTORY


Hx Cholecystectomy: Yes





- ANESTHESIA


Hx Anesthesia: Yes





Meds


Allergies/Adverse Reactions: 


 Allergies











Allergy/AdvReac Type Severity Reaction Status Date / Time


 


Penicillins Allergy  RASH Verified 07/01/16 20:50














Results





- Vital Signs


Recent Vital Signs: 





 Last Vital Signs











Temp  97.8 F   02/02/18 01:02


 


Pulse  51 L  02/02/18 01:02


 


Resp  16   02/02/18 01:02


 


BP  99/57 L  02/02/18 01:02


 


Pulse Ox  95   02/02/18 01:02














- Labs


Result Diagrams: 


 02/01/18 20:45





 02/01/18 20:45


Labs: 





 Laboratory Results - last 24 hr











  02/01/18 02/01/18 02/01/18





  20:45 20:45 21:00


 


WBC  6.7  


 


RBC  5.27  


 


Hgb  15.0  


 


Hct  45.4  


 


MCV  86.2  


 


MCH  28.4  


 


MCHC  32.9 L  


 


RDW  13.9  


 


Plt Count  164  


 


MPV  9.8  


 


Neut % (Auto)  57.5  


 


Lymph % (Auto)  32.0  


 


Mono % (Auto)  8.5  


 


Eos % (Auto)  1.7  


 


Baso % (Auto)  0.3  


 


Neut # (Auto)  3.9  


 


Lymph # (Auto)  2.2  


 


Mono # (Auto)  0.6  


 


Eos # (Auto)  0.1  


 


Baso # (Auto)  0.0  


 


Sodium   142 


 


Potassium   3.7 


 


Chloride   101 


 


Carbon Dioxide   30 


 


Anion Gap   15 


 


BUN   25 H 


 


Creatinine   0.9 


 


Est GFR ( Amer)   > 60 


 


Est GFR (Non-Af Amer)   > 60 


 


Random Glucose   84 


 


Calcium   9.5 


 


Total Bilirubin   0.7 


 


AST   36 


 


ALT   49 


 


Alkaline Phosphatase   86 


 


Troponin I   < 0.0120 


 


Total Protein   7.5 


 


Albumin   4.4 


 


Globulin   3.1 


 


Albumin/Globulin Ratio   1.4 


 


Lipase   81 


 


Urine Color    Yellow


 


Urine Clarity    Slighty-cloudy


 


Urine pH    5.0


 


Ur Specific Gravity    1.029


 


Urine Protein    30


 


Urine Glucose (UA)    Neg


 


Urine Ketones    Negative


 


Urine Blood    Negative


 


Urine Nitrate    Negative


 


Urine Bilirubin    Negative


 


Urine Urobilinogen    2.0


 


Ur Leukocyte Esterase    Neg


 


Urine RBC (Auto)    2


 


Urine Microscopic WBC    2


 


Ur Squamous Epith Cells    < 1


 


Urine Bacteria    Rare

## 2018-02-02 NOTE — CARD
--------------- APPROVED REPORT --------------





EKG Measurement

Heart [id number]

NE [id number]

[id number]

[id number]

[id number]



<Conclusion>

Sinus bradycardia

Otherwise normal ECG

## 2018-02-03 VITALS
HEART RATE: 60 BPM | OXYGEN SATURATION: 95 % | TEMPERATURE: 97.6 F | SYSTOLIC BLOOD PRESSURE: 123 MMHG | RESPIRATION RATE: 19 BRPM | DIASTOLIC BLOOD PRESSURE: 75 MMHG

## 2018-02-03 RX ADMIN — METRONIDAZOLE SCH MLS/HR: 500 INJECTION, SOLUTION INTRAVENOUS at 04:21

## 2018-02-03 RX ADMIN — CIPROFLOXACIN SCH MLS/HR: 2 INJECTION, SOLUTION INTRAVENOUS at 08:59

## 2018-02-03 RX ADMIN — METRONIDAZOLE SCH MLS/HR: 500 INJECTION, SOLUTION INTRAVENOUS at 08:59

## 2018-02-03 RX ADMIN — ENOXAPARIN SODIUM SCH MG: 40 INJECTION SUBCUTANEOUS at 08:58

## 2018-02-03 NOTE — CP.PCM.DIS
Provider





- Provider


Date of Admission: 


02/01/18 22:46





Attending physician: 


Linnette Carver MD





Time Spent in preparation of Discharge (in minutes): 15





Diagnosis





- Discharge Diagnosis


(1) Diverticulitis


Status: Acute   





Hospital Course





- Lab Results


Lab Results: 


 Most Recent Lab Values











WBC  6.7 K/uL (4.8-10.8)   02/01/18  20:45    


 


RBC  5.27 Mil/uL (4.40-5.90)   02/01/18  20:45    


 


Hgb  15.0 g/dL (12.0-18.0)   02/01/18  20:45    


 


Hct  45.4 % (35.0-51.0)   02/01/18  20:45    


 


MCV  86.2 fl (80.0-94.0)   02/01/18  20:45    


 


MCH  28.4 pg (27.0-31.0)   02/01/18  20:45    


 


MCHC  32.9 g/dL (33.0-37.0)  L  02/01/18  20:45    


 


RDW  13.9 % (11.5-14.5)   02/01/18  20:45    


 


Plt Count  164 K/uL (130-400)   02/01/18  20:45    


 


MPV  9.8 fl (7.2-11.7)   02/01/18  20:45    


 


Neut % (Auto)  57.5 % (50.0-75.0)   02/01/18  20:45    


 


Lymph % (Auto)  32.0 % (20.0-40.0)   02/01/18  20:45    


 


Mono % (Auto)  8.5 % (0.0-10.0)   02/01/18  20:45    


 


Eos % (Auto)  1.7 % (0.0-4.0)   02/01/18  20:45    


 


Baso % (Auto)  0.3 % (0.0-2.0)   02/01/18  20:45    


 


Neut # (Auto)  3.9 K/uL (1.8-7.0)   02/01/18  20:45    


 


Lymph # (Auto)  2.2 K/uL (1.0-4.3)   02/01/18  20:45    


 


Mono # (Auto)  0.6 K/uL (0.0-0.8)   02/01/18  20:45    


 


Eos # (Auto)  0.1 K/uL (0.0-0.7)   02/01/18  20:45    


 


Baso # (Auto)  0.0 K/uL (0.0-0.2)   02/01/18  20:45    


 


Sodium  142 mmol/l (132-148)   02/01/18  20:45    


 


Potassium  3.7 MMOL/L (3.6-5.0)   02/01/18  20:45    


 


Chloride  101 mmol/L ()   02/01/18  20:45    


 


Carbon Dioxide  30 mmol/L (22-30)   02/01/18  20:45    


 


Anion Gap  15  (10-20)   02/01/18  20:45    


 


BUN  25 mg/dl (9-20)  H  02/01/18  20:45    


 


Creatinine  0.9 mg/dl (0.8-1.5)   02/01/18  20:45    


 


Est GFR ( Amer)  > 60   02/01/18  20:45    


 


Est GFR (Non-Af Amer)  > 60   02/01/18  20:45    


 


Random Glucose  84 mg/dL ()   02/01/18  20:45    


 


Calcium  9.5 mg/dL (8.4-10.2)   02/01/18  20:45    


 


Total Bilirubin  0.7 mg/dl (0.2-1.3)   02/01/18  20:45    


 


AST  36 U/L (17-59)   02/01/18  20:45    


 


ALT  49 U/L (21-72)   02/01/18  20:45    


 


Alkaline Phosphatase  86 U/L ()   02/01/18  20:45    


 


Troponin I  < 0.0120 ng/mL (0.00-0.120)   02/02/18  12:50    


 


Total Protein  7.5 G/DL (6.3-8.2)   02/01/18  20:45    


 


Albumin  4.4 g/dL (3.5-5.0)   02/01/18  20:45    


 


Globulin  3.1 gm/dL (2.2-3.9)   02/01/18  20:45    


 


Albumin/Globulin Ratio  1.4  (1.0-2.1)   02/01/18  20:45    


 


Lipase  81 U/L ()   02/01/18  20:45    


 


Urine Color  Yellow  (YELLOW)   02/01/18  21:00    


 


Urine Clarity  Slighty-cloudy  (Clear)   02/01/18  21:00    


 


Urine pH  5.0  (5.0-8.0)   02/01/18  21:00    


 


Ur Specific Gravity  1.029  (1.003-1.030)   02/01/18  21:00    


 


Urine Protein  30 mg/dL (NEGATIVE)   02/01/18  21:00    


 


Urine Glucose (UA)  Neg mg/dL (Normal)   02/01/18  21:00    


 


Urine Ketones  Negative mg/dL (NEGATIVE)   02/01/18  21:00    


 


Urine Blood  Negative  (NEGATIVE)   02/01/18  21:00    


 


Urine Nitrate  Negative  (NEGATIVE)   02/01/18  21:00    


 


Urine Bilirubin  Negative  (NEGATIVE)   02/01/18  21:00    


 


Urine Urobilinogen  2.0 mg/dL (0.2-1.0)   02/01/18  21:00    


 


Ur Leukocyte Esterase  Neg Alfredito/uL (Negative)   02/01/18  21:00    


 


Urine RBC (Auto)  2 /hpf (0-3)   02/01/18  21:00    


 


Urine Microscopic WBC  2 /hpf (0-5)   02/01/18  21:00    


 


Ur Squamous Epith Cells  < 1 /hpf (0-5)   02/01/18  21:00    


 


Urine Bacteria  Rare  (<OCC)   02/01/18  21:00    














- Hospital Course


Hospital Course: 





58 y/o man w/ pmh HTN, Gout, NSTEMI (Cardiac cath with normal findings at 

[facility], as per patient) presents to ED w/ complaint of abdominal 

pain and chest pain started 4 days ago.  Patient in ED had vitals WNL, CBC WNL, 

CMP slightly elevated BUN, negative troponin, urine normal, EKG sinus dasia w/ 

no acute changes, CXR WNL, and CT abdomen showing right sided diverticulitis.  

Patient was given IV fluids, zofran, dilaudid, cipro, flagyl, and toradol.  

Patient reported improvement upon transport to [facility].  Chest pain unlikely 

cardiac in nature and resolved.  Abdominal pain improved w/ minimal pain 

medication.  Patient tolerated advancing diets.  Patient has been seen, examined

, and deemed medically fit for discharge home.  Patient will be sent home to 

finish course of ciprofloxacin 500 mg PO Q12h and metronidazole 500 mg PO Q6h 

for 5 days.  Patient counseled to follow up w/ PMD.





Discharge Exam





- Head Exam


Head Exam: ATRAUMATIC, NORMOCEPHALIC





- Eye Exam


Eye Exam: Normal appearance





- ENT Exam


ENT Exam: Mucous Membranes Moist





- Respiratory Exam


Respiratory Exam: Clear to PA & Lateral, NORMAL BREATHING PATTERN.  absent: 

Decreased Breath Sounds, Prolonged Expiratory Phase, Rales, Rhonchi, Wheezes, 

Respiratory Distress





- Cardiovascular Exam


Cardiovascular Exam: REGULAR RHYTHM.  absent: Tachycardia





- GI/Abdominal Exam


GI & Abdominal Exam: Normal Bowel Sounds, Soft.  absent: Distended, Guarding, 

Mass, Organomegaly, Rebound, Tenderness





- Extremities Exam


Extremities exam: normal inspection





- Neurological Exam


Neurological exam: Alert, Normal Gait, Oriented x3





- Skin


Skin Exam: Dry, Intact, Normal Color, Warm





Discharge Plan





- Discharge Medications


Prescriptions: 


Ciprofloxacin [Cipro] 500 mg PO Q12 5 Days #10 tab


Metronidazole [Flagyl] 500 mg PO Q6 5 Days #20 tab





- Follow Up Plan


Condition: FAIR


Disposition: HOME/ ROUTINE


Instructions:  Diverticulitis (DC), Diverticulitis (GEN), Diverticulitis Diet (

DC), Diverticulitis Diet (GEN)

## 2018-02-13 ENCOUNTER — HOSPITAL ENCOUNTER (EMERGENCY)
Dept: HOSPITAL 14 - H.ER | Age: 58
Discharge: HOME | End: 2018-02-13
Payer: SELF-PAY

## 2018-02-13 VITALS — BODY MASS INDEX: 28.4 KG/M2

## 2018-02-13 VITALS
RESPIRATION RATE: 18 BRPM | TEMPERATURE: 97.8 F | HEART RATE: 73 BPM | SYSTOLIC BLOOD PRESSURE: 137 MMHG | OXYGEN SATURATION: 99 % | DIASTOLIC BLOOD PRESSURE: 75 MMHG
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
## 2018-02-13 DIAGNOSIS — S61.211A: Primary | ICD-10-CM

## 2018-02-13 LAB
APTT BLD: 31.6 SECONDS (ref 25.6–37.1)
BASOPHILS # BLD AUTO: 0 K/UL (ref 0–0.2)
BASOPHILS NFR BLD: 0.4 % (ref 0–2)
BUN SERPL-MCNC: 19 MG/DL (ref 9–20)
CALCIUM SERPL-MCNC: 9.6 MG/DL (ref 8.4–10.2)
EOSINOPHIL # BLD AUTO: 0.1 K/UL (ref 0–0.7)
EOSINOPHIL NFR BLD: 0.9 % (ref 0–4)
ERYTHROCYTE [DISTWIDTH] IN BLOOD BY AUTOMATED COUNT: 13.7 % (ref 11.5–14.5)
GFR NON-AFRICAN AMERICAN: > 60
HGB BLD-MCNC: 14.2 G/DL (ref 12–18)
INR PPP: 1.1 (ref 0.9–1.2)
LYMPHOCYTES # BLD AUTO: 1.4 K/UL (ref 1–4.3)
LYMPHOCYTES NFR BLD AUTO: 19.8 % (ref 20–40)
MCH RBC QN AUTO: 28.5 PG (ref 27–31)
MCHC RBC AUTO-ENTMCNC: 33.4 G/DL (ref 33–37)
MCV RBC AUTO: 85.6 FL (ref 80–94)
MONOCYTES # BLD: 0.8 K/UL (ref 0–0.8)
MONOCYTES NFR BLD: 11.7 % (ref 0–10)
NEUTROPHILS # BLD: 4.7 K/UL (ref 1.8–7)
NEUTROPHILS NFR BLD AUTO: 67.2 % (ref 50–75)
NRBC BLD AUTO-RTO: 0.2 % (ref 0–0)
PLATELET # BLD: 185 K/UL (ref 130–400)
PMV BLD AUTO: 9.7 FL (ref 7.2–11.7)
PROTHROMBIN TIME: 12.5 SECONDS (ref 9.8–13.1)
RBC # BLD AUTO: 4.98 MIL/UL (ref 4.4–5.9)
WBC # BLD AUTO: 7 K/UL (ref 4.8–10.8)

## 2018-02-13 PROCEDURE — 85025 COMPLETE CBC W/AUTO DIFF WBC: CPT

## 2018-02-13 PROCEDURE — 73140 X-RAY EXAM OF FINGER(S): CPT

## 2018-02-13 PROCEDURE — 12001 RPR S/N/AX/GEN/TRNK 2.5CM/<: CPT

## 2018-02-13 PROCEDURE — 86900 BLOOD TYPING SEROLOGIC ABO: CPT

## 2018-02-13 PROCEDURE — 85730 THROMBOPLASTIN TIME PARTIAL: CPT

## 2018-02-13 PROCEDURE — 86850 RBC ANTIBODY SCREEN: CPT

## 2018-02-13 PROCEDURE — 96375 TX/PRO/DX INJ NEW DRUG ADDON: CPT

## 2018-02-13 PROCEDURE — 85610 PROTHROMBIN TIME: CPT

## 2018-02-13 PROCEDURE — 80048 BASIC METABOLIC PNL TOTAL CA: CPT

## 2018-02-13 PROCEDURE — 99283 EMERGENCY DEPT VISIT LOW MDM: CPT

## 2018-02-13 PROCEDURE — 96374 THER/PROPH/DIAG INJ IV PUSH: CPT

## 2018-02-13 NOTE — ED PDOC
HPI: Wound Care





- HPI


Time Seen by Provider: 02/13/18 14:54


Chief Complaint (Nursing): Upper Extremity Problem/Injury


Chief Complaint (Provider): Left index finger laceration


History Per: Patient


Exam Limitations: no limitations


Onset/Duration Of Symptoms: Mins (x30)


Current Symptoms Are (Timing): Still Present


Additional Complaint(s): 


57 year old male presents to the ER for evaluation of a left finger injury, 

sustained 30 min prior to arrival. States he was using a saw and it 

accidentally went through the left index finger. Now patient reports numbness 

to the left index finger tip. Last tetanus was approximately 5 years ago. 





PMD: Dr. Deonte Fisher    





Past Medical History


Reviewed: Historical Data, Nursing Documentation, Vital Signs


Vital Signs: 





 Last Vital Signs











Temp  97.8 F   02/13/18 14:34


 


Pulse  73   02/13/18 14:34


 


Resp  18   02/13/18 14:34


 


BP  137/75   02/13/18 14:34


 


Pulse Ox  99   02/13/18 14:34














- Medical History


PMH: HTN


   Denies: Chronic Kidney Disease





- Surgical History


Surgical History: Cholecystectomy





- Family History


Family History: States: Unknown Family Hx





- Social History


Current smoker - smoking cessation education provided: No


Alcohol: None


Drugs: Denies





- Immunization History


Hx Tetanus Toxoid Vaccination: Yes (5 years ago)





- Home Medications


Home Medications: 


 Ambulatory Orders











 Medication  Instructions  Recorded


 


Aspirin [Ecotrin] 81 mg PO DAILY 09/13/17


 


Ciprofloxacin [Cipro] 500 mg PO Q12 5 Days #10 tab 02/03/18


 


Metronidazole [Flagyl] 500 mg PO Q6 5 Days #20 tab 02/03/18


 


Clindamycin [Cleocin] 300 mg PO BID #14 cap 02/13/18


 


Ibuprofen [Motrin] 600 mg PO Q6 #20 tab 02/13/18














- Allergies


Allergies/Adverse Reactions: 


 Allergies











Allergy/AdvReac Type Severity Reaction Status Date / Time


 


Penicillins Allergy  RASH Verified 07/01/16 20:50














Review of Systems


ROS Statement: Except As Marked, All Systems Reviewed And Found Negative


Skin: Positive for: Lesions (to left index finger)


Neurological: Positive for: Weakness (unable to flex finger), Numbness (to left 

index fingertip)





Physical Exam





- Reviewed


Nursing Documentation Reviewed: Yes


Vital Signs Reviewed: Yes





- Physical Exam


Appears: Positive for: Non-toxic, No Acute Distress


Head Exam: Positive for: ATRAUMATIC, NORMOCEPHALIC


Skin: Positive for: Normal Color, Warm, Dry


Eye Exam: Positive for: EOMI, Normal appearance, PERRL


Neck: Positive for: Normal, Painless ROM


Extremity: Positive for: Other (Near circumferential 2.5 cm laceration under 

base of nail bed at distal phalanx of left 2nd digit. (+)Discoloration. Unable 

to flex/extend at DIP and PIP).  Negative for: Normal ROM


Neurologic/Psych: Positive for: Alert, Oriented





- Laboratory Results


Result Diagrams: 


 02/13/18 15:20





 02/13/18 15:20





- ECG


O2 Sat by Pulse Oximetry: 99 (RA)


Pulse Ox Interpretation: Normal





Medical Decision Making


Medical Decision Making: 





Time: 14:56


Initial Plan:


--Blood type and screen


--BMP


--CBC


--PTT


--Prothrombin time


--Clindamycin 600 mg IV


--Morphine 4 mg IV


--X-Ray Left Hand 2nd Digit 


--Reevaluation 





16:18


X-RAY LEFT HAND:


FINDINGS:


LEFT INDEX FINGER:


Acute fracture/transection of the distal phalanx left 2nd digit. Remainder of 

the left hand (as seen on the AP view) grossly intact.





JOINTS:


Normal. 





SOFT TISSUES:


Soft tissue swelling attests to the acuity of the fracture. No visulaized 

radiopaque/visualized foreign body. 





OTHER FINDINGS:


None.





IMPRESSION:


Acute soft tissue injury/ fracture distal phalanx left index finger.








Paged Dr. Peck, reviewed imaging results, and he will see patient in the ED.





17:41


Dr. Peck at bedside, repairing laceration. Recommends patient be discharged 

home with Clindamycin. Patient will follow up in Dr. Peck's office. There is 

agreement to discharge plan. Return if symptoms persist or worsen.





--------------------------------------------------------------------------------

-----------------


Scribe Attestation:   


Documented by Linda Castillo, acting as a scribe for Ramez Strong PA-C





Provider Scribe Attestation:


All medical record entries made by the Scribe were at my direction and 

personally dictated by me. I have reviewed the chart and agree that the record 

accurately reflects my personal performance of the history, physical exam, 

medical decision making, and the department course for this patient. I have 

also personally directed, reviewed, and agree with the discharge instructions 

and disposition.





Disposition





- Clinical Impression


Clinical Impression: 


 Laceration








- Patient ED Disposition


Is Patient to be Admitted: Transfer of Care


Counseled Patient/Family Regarding: Diagnosis, Need For Followup, Rx Given





- Disposition


Referrals: 


Terry Peck MD [Staff Provider] - 


Disposition: Routine/Home


Disposition Time: 18:10


Condition: STABLE


Prescriptions: 


Clindamycin [Cleocin] 300 mg PO BID #14 cap


Ibuprofen [Motrin] 600 mg PO Q6 #20 tab


Instructions:  Laceration (ED)


Forms:  [facility] (English)


Patient Signed Over To: Katarina Lima


Handoff Comments: PENDING REPAIR AND D/C HOME

## 2018-02-13 NOTE — RAD
PROCEDURE:  Left Index finger radiographs.



HISTORY:

FINGER AMPUTATION



COMPARISON:

None.



TECHNIQUE:

AP radiograph of the left hand, as well as spot oblique and lateral 

images of index finger were obtained.



FINDINGS:



LEFT INDEX FINGER:

Acute fracture/transection of the distal phalanx left 2nd digit. 

Remainder of the left hand (as seen on the AP view) grossly intact.



JOINTS:

Normal. 



SOFT TISSUES:

Soft tissue swelling attests to the acuity of the fracture. No 

visulaized radiopaque/visualized foreign body. 



OTHER FINDINGS:

None.



IMPRESSION:

Acute soft tissue injury/ fracture distal phalanx left index finger.

## 2019-04-05 ENCOUNTER — HOSPITAL ENCOUNTER (EMERGENCY)
Dept: HOSPITAL 14 - H.ER | Age: 59
Discharge: HOME | End: 2019-04-05
Payer: COMMERCIAL

## 2019-04-05 VITALS
OXYGEN SATURATION: 100 % | SYSTOLIC BLOOD PRESSURE: 165 MMHG | DIASTOLIC BLOOD PRESSURE: 97 MMHG | HEART RATE: 67 BPM | TEMPERATURE: 97.6 F | RESPIRATION RATE: 19 BRPM

## 2019-04-05 VITALS — BODY MASS INDEX: 28.4 KG/M2

## 2019-04-05 DIAGNOSIS — K57.30: Primary | ICD-10-CM

## 2019-04-05 DIAGNOSIS — Z79.82: ICD-10-CM

## 2019-04-05 DIAGNOSIS — Z88.0: ICD-10-CM

## 2019-04-05 DIAGNOSIS — I10: ICD-10-CM

## 2019-04-05 LAB
ALBUMIN SERPL-MCNC: 4.4 G/DL (ref 3.5–5)
ALBUMIN/GLOB SERPL: 1.4 {RATIO} (ref 1–2.1)
ALT SERPL-CCNC: 48 U/L (ref 21–72)
AST SERPL-CCNC: 46 U/L (ref 17–59)
BASOPHILS # BLD AUTO: 0 K/UL (ref 0–0.2)
BASOPHILS NFR BLD: 0.5 % (ref 0–2)
BILIRUB UR-MCNC: NEGATIVE MG/DL
BUN SERPL-MCNC: 23 MG/DL (ref 9–20)
CALCIUM SERPL-MCNC: 9.6 MG/DL (ref 8.4–10.2)
COLOR UR: YELLOW
EOSINOPHIL # BLD AUTO: 0 K/UL (ref 0–0.7)
EOSINOPHIL NFR BLD: 1 % (ref 0–4)
ERYTHROCYTE [DISTWIDTH] IN BLOOD BY AUTOMATED COUNT: 13.4 % (ref 11.5–14.5)
GFR NON-AFRICAN AMERICAN: > 60
GLUCOSE UR STRIP-MCNC: (no result) MG/DL
HGB BLD-MCNC: 15.1 G/DL (ref 12–18)
LEUKOCYTE ESTERASE UR-ACNC: (no result) LEU/UL
LYMPHOCYTES # BLD AUTO: 1.7 K/UL (ref 1–4.3)
LYMPHOCYTES NFR BLD AUTO: 40.2 % (ref 20–40)
MCH RBC QN AUTO: 29.2 PG (ref 27–31)
MCHC RBC AUTO-ENTMCNC: 33.8 G/DL (ref 33–37)
MCV RBC AUTO: 86.6 FL (ref 80–94)
MONOCYTES # BLD: 0.4 K/UL (ref 0–0.8)
MONOCYTES NFR BLD: 10.2 % (ref 0–10)
NEUTROPHILS # BLD: 2 K/UL (ref 1.8–7)
NEUTROPHILS NFR BLD AUTO: 48.1 % (ref 50–75)
NRBC BLD AUTO-RTO: 0 % (ref 0–0)
PH UR STRIP: 7 [PH] (ref 5–8)
PLATELET # BLD: 154 K/UL (ref 130–400)
PMV BLD AUTO: 10.2 FL (ref 7.2–11.7)
PROT UR STRIP-MCNC: NEGATIVE MG/DL
RBC # BLD AUTO: 5.18 MIL/UL (ref 4.4–5.9)
RBC # UR STRIP: NEGATIVE /UL
SP GR UR STRIP: 1.05 (ref 1–1.03)
URINE CLARITY: CLEAR
UROBILINOGEN UR-MCNC: (no result) MG/DL (ref 0.2–1)
WBC # BLD AUTO: 4.2 K/UL (ref 4.8–10.8)

## 2019-04-05 PROCEDURE — 80053 COMPREHEN METABOLIC PANEL: CPT

## 2019-04-05 PROCEDURE — 96375 TX/PRO/DX INJ NEW DRUG ADDON: CPT

## 2019-04-05 PROCEDURE — 99283 EMERGENCY DEPT VISIT LOW MDM: CPT

## 2019-04-05 PROCEDURE — 96374 THER/PROPH/DIAG INJ IV PUSH: CPT

## 2019-04-05 PROCEDURE — 74177 CT ABD & PELVIS W/CONTRAST: CPT

## 2019-04-05 PROCEDURE — 85025 COMPLETE CBC W/AUTO DIFF WBC: CPT

## 2019-04-05 PROCEDURE — 93005 ELECTROCARDIOGRAM TRACING: CPT

## 2019-04-05 PROCEDURE — 81003 URINALYSIS AUTO W/O SCOPE: CPT

## 2019-04-05 NOTE — ED PDOC
HPI: Abdomen


Time Seen by Provider: 04/05/19 17:21


Chief Complaint (Nursing): Abdominal Pain


History Per: Patient


Onset/Duration Of Symptoms: Days (7)


Current Symptoms Are (Timing): Intermittent Episodes


Severity: Moderate


Location Of Pain/Discomfort: RLQ


Associated Symptoms: Nausea, Vomiting.  denies: Fever


Exacerbating Factors: None


Alleviating Factors: None


Additional Complaint(s): 





Right sided abd pain intermittently x 1 week. Worse over past 2 days. Assoc with

nausea and vomiting but no diarrhea or fever. Denies urinary sxs.





Past Medical History


Vital Signs: 





                                Last Vital Signs











Temp  97.6 F   04/05/19 17:13


 


Pulse  67   04/05/19 17:13


 


Resp  19   04/05/19 17:13


 


BP  165/97 H  04/05/19 17:13


 


Pulse Ox  100   04/05/19 17:13














- Medical History


PMH: Diverticulitis, HTN


   Denies: Chronic Kidney Disease





- Surgical History


Surgical History: Appendectomy, Cholecystectomy





- Family History


Family History: States: Unknown Family Hx





- Immunization History


Hx Tetanus Toxoid Vaccination: Yes (5 years ago)





- Home Medications


Home Medications: 


                                Ambulatory Orders











 Medication  Instructions  Recorded


 


Aspirin [Ecotrin] 81 mg PO DAILY 09/13/17


 


Ciprofloxacin [Cipro] 500 mg PO Q12 5 Days #10 tab 02/03/18


 


Metronidazole [Flagyl] 500 mg PO Q6 5 Days #20 tab 02/03/18


 


Clindamycin [Cleocin] 300 mg PO BID #14 cap 02/13/18


 


Ibuprofen [Motrin] 600 mg PO Q6 #20 tab 02/13/18


 


Cyclobenzaprine [Cyclobenzaprine 10 mg PO Q8 PRN #14 tab 03/01/19





HCl]  


 


Meloxicam [Mobic] 15 mg PO DAILY PRN #10 tab 03/01/19














- Allergies


Allergies/Adverse Reactions: 


                                    Allergies











Allergy/AdvReac Type Severity Reaction Status Date / Time


 


Penicillins Allergy  ANAPHYLAXIS Verified 03/01/19 19:21














Review of Systems


ROS Statement: Except As Marked, All Systems Reviewed And Found Negative


Constitutional: Negative for: Fever, Chills


Gastrointestinal: Positive for: Nausea, Vomiting, Abdominal Pain.  Negative for:

 Diarrhea, Melena, Hematochezia, Hematemesis


Genitourinary Male: Negative for: Dysuria, Frequency





Physical Exam





- Reviewed


Nursing Documentation Reviewed: Yes


Vital Signs Reviewed: Yes





- Physical Exam


Appears: Positive for: Non-toxic, No Acute Distress


Head Exam: Positive for: ATRAUMATIC, NORMAL INSPECTION, NORMOCEPHALIC


Skin: Positive for: Normal Color, Warm, DRY


Eye Exam: Positive for: EOMI, Normal appearance, PERRL


ENT: Positive for: Normal ENT Inspection


Neck: Positive for: Normal, Painless ROM


Cardiovascular/Chest: Positive for: Regular Rate, Rhythm


Respiratory: Positive for: CNT, Normal Breath Sounds


Gastrointestinal/Abdominal: Positive for: Soft, Tenderness (RLQ).  Negative for:

 Rebound


Back: Positive for: Normal Inspection


Extremity: Positive for: Normal ROM


Neurological/Psych: Positive for: Awake, Alert, Normal Tone





- ECG


O2 Sat by Pulse Oximetry: 100





Medical Decision Making


Medical Decision Making: 





Pt states he still has his appendix





Disposition





- Clinical Impression


Clinical Impression: 


 Abdominal pain








- Patient ED Disposition


Is Patient to be Admitted: Transfer of Care





- Disposition


Disposition: Transfer of Care


Disposition Time: 18:59


Condition: FAIR


Forms:  [employer] (English)


Patient Signed Over To: Jhon Sherman (Pending CT and reeval)

## 2019-04-05 NOTE — ED PDOC
- Laboratory Results


Result Diagrams: 


                                 04/05/19 18:52





                                 04/05/19 18:52


Lab Results: 





                                        











Total Bilirubin  1.0 mg/dl (0.2-1.3)   04/05/19  18:52    


 


AST  46 U/L (17-59)   04/05/19  18:52    


 


ALT  48 U/L (21-72)   04/05/19  18:52    


 


Alkaline Phosphatase  81 U/L ()   04/05/19  18:52    


 


Total Protein  7.5 G/DL (6.3-8.2)   04/05/19  18:52    


 


Albumin  4.4 g/dL (3.5-5.0)   04/05/19  18:52    


 


Globulin  3.1 gm/dL (2.2-3.9)   04/05/19  18:52    


 


Albumin/Globulin Ratio  1.4  (1.0-2.1)   04/05/19  18:52    














- ECG


O2 Sat by Pulse Oximetry: 100





Medical Decision Making


Medical Decision Making: 


Time: 1900


--Patient is endorsed to provider by Dr. Jones, pending CT ABD/pelvis results 

and re-evaluation.





Time: 2015


--CT ABD/pelvis FINDINGS: 


LUNG BASES: 


The lung bases appear clear. No pleural effusions are seen. 


LIVER: 


There is hepatomegaly.  The liver measured 18.0 cm in the midclavicular line. 


GALLBLADDER AND BILE DUCTS: 


Status post cholecystectomy. No biliary ductal dilatation is evident. 


PANCREAS: 


Unremarkable. 


SPLEEN: 


Unremarkable. 


ADRENAL GLANDS: 


Unremarkable. 


KIDNEYS, URETERS, AND BLADDER: 


The kidneys appear within normal limits. There is no hydronephrosis or hydroure

ter. No urinary calculi are seen. The urinary bladder appeared normal in size 

and configuration. 


STOMACH AND BOWEL: 


Unremarkable appearance of the stomach and bowel. No evidence of bowel 

obstruction. No evidence suggesting enteritis or colitis. Mild diverticulosis 

coli noted in the sigmoid colon without evidence of acute diverticulitis. 


APPENDIX: 


No evidence of acute appendicitis on CT examination. 


PERITONEUM: 


No free fluid. No free air. 


LYMPH NODES: 


No lymphadenopathy is evident. 


REPRODUCTIVE: 


Unremarkable as visualized. 


VASCULATURE: 


No evidence of abdominal aortic aneurysm. Minor atherosclerotic vascular 

plaquing is present. 


BONES: 


No aggressive appearing osseous lesion. No acute osseous pathology evident. 

There is evidence of degenerative disc disease at T12-L1 and all levels of the 

lumbar spine. 


IMPRESSION: 


1.  Hepatomegaly. 


2.  Status post cholecystectomy. 


3.  Diverticulosis coli of the sigmoid colon.  No acute diverticulitis. 


4.  Evidence of degenerative disc disease at T12-L1 and all levels of the lumbar

spine. 


5.  No significant interval change is identified.





Time: 2100


--Upon provider reevaluation, patient is medically stable, reports improvement 

in symptoms, and requires no further treatment in the ED at this time. Patient w

ill be discharged home with Rx for Bentyl and Zofran. Counseling was provided 

and all questions were answered regarding. There is agreement to discharge plan.

Return if symptoms persist or worsen.





Clinical Impression: Abdominal pain





---------------------

------------------------------------------------------------------------------


Scribe Attestation:


Documented by Yessica Wolfe, acting as a scribe for Jhon Sherman MD.


Provider Scribe Attestation:


All medical record entries made by the Scribe were at my direction and 

personally dictated by me. I have reviewed the chart and agree that the record 

accurately reflects my personal performance of the history, physical exam, 

medical decision making, and the department course for this patient. I have also

personally directed, reviewed, and agree with the discharge instructions and 

disposition.





Disposition


Counseled Patient/Family Regarding: Studies Performed, Diagnosis, Rx Given





- Clinical Impression


Clinical Impression: 


 Abdominal pain








- POA


Present On Arrival: None





- Disposition


Disposition: Routine/Home


Disposition Time: 21:00


Condition: IMPROVED


Prescriptions: 


Dicyclomine [Bentyl] 20 mg PO Q12 PRN #20 tab


 PRN Reason: abdominal pain/diarrhea


Ondansetron ODT [Zofran ODT] 4 mg PO Q6 PRN #8 odt


 PRN Reason: Nausea/Vomiting


Instructions:  Nausea and Vomiting, Adult (DC)


Forms:  [employer] (English)


Print Language: Bhutanese

## 2019-04-06 NOTE — CARD
--------------- APPROVED REPORT --------------





Date of service: 04/05/2019



EKG Measurement

Heart [id number]

[place] [id number]

[id number]

[id number]

[id number]



<Conclusion>

Normal sinus rhythm

Normal ECG